# Patient Record
Sex: FEMALE | Race: WHITE | NOT HISPANIC OR LATINO | URBAN - METROPOLITAN AREA
[De-identification: names, ages, dates, MRNs, and addresses within clinical notes are randomized per-mention and may not be internally consistent; named-entity substitution may affect disease eponyms.]

---

## 2020-07-07 ENCOUNTER — TELEPHONE ENCOUNTER (OUTPATIENT)
Dept: URBAN - METROPOLITAN AREA CLINIC 92 | Facility: CLINIC | Age: 75
End: 2020-07-07

## 2020-08-13 ENCOUNTER — OFFICE VISIT (OUTPATIENT)
Dept: URBAN - METROPOLITAN AREA CLINIC 12 | Facility: CLINIC | Age: 75
End: 2020-08-13

## 2020-10-07 ENCOUNTER — OFFICE VISIT (OUTPATIENT)
Dept: URBAN - METROPOLITAN AREA CLINIC 78 | Facility: CLINIC | Age: 75
End: 2020-10-07

## 2020-11-11 ENCOUNTER — OFFICE VISIT (OUTPATIENT)
Dept: URBAN - METROPOLITAN AREA CLINIC 12 | Facility: CLINIC | Age: 75
End: 2020-11-11

## 2020-11-18 ENCOUNTER — OFFICE VISIT (OUTPATIENT)
Dept: URBAN - METROPOLITAN AREA CLINIC 12 | Facility: CLINIC | Age: 75
End: 2020-11-18
Payer: MEDICARE

## 2020-11-18 VITALS
HEIGHT: 64 IN | HEART RATE: 87 BPM | DIASTOLIC BLOOD PRESSURE: 83 MMHG | TEMPERATURE: 97.3 F | BODY MASS INDEX: 28.79 KG/M2 | SYSTOLIC BLOOD PRESSURE: 135 MMHG | WEIGHT: 168.6 LBS

## 2020-11-18 DIAGNOSIS — K30 FUNCTIONAL DYSPEPSIA: ICD-10-CM

## 2020-11-18 DIAGNOSIS — K58.9 IBS (IRRITABLE BOWEL SYNDROME): ICD-10-CM

## 2020-11-18 PROCEDURE — 99213 OFFICE O/P EST LOW 20 MIN: CPT | Performed by: STUDENT IN AN ORGANIZED HEALTH CARE EDUCATION/TRAINING PROGRAM

## 2020-11-18 PROCEDURE — G8482 FLU IMMUNIZE ORDER/ADMIN: HCPCS | Performed by: STUDENT IN AN ORGANIZED HEALTH CARE EDUCATION/TRAINING PROGRAM

## 2020-11-18 PROCEDURE — G8420 CALC BMI NORM PARAMETERS: HCPCS | Performed by: STUDENT IN AN ORGANIZED HEALTH CARE EDUCATION/TRAINING PROGRAM

## 2020-11-18 PROCEDURE — G8427 DOCREV CUR MEDS BY ELIG CLIN: HCPCS | Performed by: STUDENT IN AN ORGANIZED HEALTH CARE EDUCATION/TRAINING PROGRAM

## 2020-11-18 PROCEDURE — G9903 PT SCRN TBCO ID AS NON USER: HCPCS | Performed by: STUDENT IN AN ORGANIZED HEALTH CARE EDUCATION/TRAINING PROGRAM

## 2020-11-18 RX ORDER — ESCITALOPRAM 20 MG/1
1 TABLET TABLET, FILM COATED ORAL ONCE A DAY
Status: ACTIVE | COMMUNITY

## 2020-11-18 RX ORDER — OMEPRAZOLE 40 MG/1
TAKE 1 CAPSULE BY ORAL ROUTE QD CAPSULE, DELAYED RELEASE PELLETS ORAL 1
Qty: 30 | Refills: 3 | Status: ON HOLD | COMMUNITY
Start: 2017-05-24

## 2020-11-18 RX ORDER — BUSPIRONE HYDROCHLORIDE 7.5 MG/1
1 TABLET TABLET ORAL TWICE A DAY
Status: ACTIVE | COMMUNITY

## 2020-11-18 NOTE — HPI-TODAY'S VISIT:
75F here for follow up of gas/bloating. Last seen via telehealth by Dr. Burton. Symptoms are worsened with extremes of her mood -- particularly when stressed, anxious, angry etc. Has tried low fodmap without much relief, but says it is difficult to adhere to that. Tried a probiotic without relief.  Has bouts of diarrhea and constipation alternating every few days  Has tried hyoscyamine, which she says sometimes helps but sometimes does not  Symptoms worsened when she drinks lacohol CT scan in 5/2019 was normal.

## 2020-11-19 PROBLEM — 10743008 IRRITABLE BOWEL SYNDROME: Status: ACTIVE | Noted: 2020-11-19

## 2020-11-19 PROBLEM — 3696007 FUNCTIONAL DYSPEPSIA: Status: ACTIVE | Noted: 2020-11-19

## 2021-03-16 ENCOUNTER — DASHBOARD ENCOUNTERS (OUTPATIENT)
Age: 76
End: 2021-03-16

## 2021-03-16 ENCOUNTER — OFFICE VISIT (OUTPATIENT)
Dept: URBAN - METROPOLITAN AREA CLINIC 12 | Facility: CLINIC | Age: 76
End: 2021-03-16
Payer: MEDICARE

## 2021-03-16 VITALS
SYSTOLIC BLOOD PRESSURE: 143 MMHG | HEIGHT: 64 IN | HEART RATE: 86 BPM | DIASTOLIC BLOOD PRESSURE: 86 MMHG | WEIGHT: 168.4 LBS | BODY MASS INDEX: 28.75 KG/M2 | TEMPERATURE: 97.1 F

## 2021-03-16 DIAGNOSIS — R11.0 NAUSEA: ICD-10-CM

## 2021-03-16 DIAGNOSIS — K58.9 IBS (IRRITABLE BOWEL SYNDROME) DIARRHEA PREDOMINANT: ICD-10-CM

## 2021-03-16 DIAGNOSIS — R10.84 GENERALIZED ABDOMINAL PAIN: ICD-10-CM

## 2021-03-16 DIAGNOSIS — K52.9 CHRONIC DIARRHEA: ICD-10-CM

## 2021-03-16 PROBLEM — 10743008 IRRITABLE BOWEL SYNDROME: Status: ACTIVE | Noted: 2021-03-16

## 2021-03-16 PROCEDURE — 99213 OFFICE O/P EST LOW 20 MIN: CPT | Performed by: INTERNAL MEDICINE

## 2021-03-16 RX ORDER — BUSPIRONE HYDROCHLORIDE 7.5 MG/1
1 TABLET TABLET ORAL TWICE A DAY
Status: ACTIVE | COMMUNITY

## 2021-03-16 RX ORDER — RIFAXIMIN 550 MG/1
1 TABLET TABLET ORAL TWICE A DAY
Qty: 28 TABLET | Refills: 0 | OUTPATIENT
Start: 2021-03-16 | End: 2021-03-30

## 2021-03-16 RX ORDER — OMEPRAZOLE 40 MG/1
TAKE 1 CAPSULE BY ORAL ROUTE QD CAPSULE, DELAYED RELEASE PELLETS ORAL 1
Qty: 30 | Refills: 3 | Status: ON HOLD | COMMUNITY
Start: 2017-05-24

## 2021-03-16 RX ORDER — ESCITALOPRAM 20 MG/1
1 TABLET TABLET, FILM COATED ORAL ONCE A DAY
Status: ACTIVE | COMMUNITY

## 2021-03-16 NOTE — HPI-TODAY'S VISIT:
75F [id number]--laxoxzwmmtikxi she has seen dr swanson and dr rodríguez for gas, bloating, abd pain she has been given PPI, anti-spasmodics, FODMAP, IBgard today she c/o gas, diarrhea, crampy abd pain no recent abx use diarrhea everyday x 3 weeks. no blood no fever

## 2021-03-22 ENCOUNTER — TELEPHONE ENCOUNTER (OUTPATIENT)
Dept: URBAN - METROPOLITAN AREA CLINIC 78 | Facility: CLINIC | Age: 76
End: 2021-03-22

## 2021-03-31 LAB — GASTROINTESTINAL PATHOGEN: (no result)

## 2021-04-02 ENCOUNTER — OFFICE VISIT (OUTPATIENT)
Dept: URBAN - METROPOLITAN AREA MEDICAL CENTER 28 | Facility: MEDICAL CENTER | Age: 76
End: 2021-04-02
Payer: MEDICARE

## 2021-04-02 DIAGNOSIS — K86.89 ATROPHIC PANCREAS: ICD-10-CM

## 2021-04-02 DIAGNOSIS — K29.80 ACUTE DUODENITIS: ICD-10-CM

## 2021-04-02 DIAGNOSIS — C25.1 CANCER, PANCREAS, BODY: ICD-10-CM

## 2021-04-02 DIAGNOSIS — K22.2 ACQUIRED ESOPHAGEAL RING: ICD-10-CM

## 2021-04-02 DIAGNOSIS — R93.3 ABN FINDINGS-GI TRACT: ICD-10-CM

## 2021-04-02 PROCEDURE — 43239 EGD BIOPSY SINGLE/MULTIPLE: CPT | Performed by: INTERNAL MEDICINE

## 2021-04-02 PROCEDURE — 43242 EGD US FINE NEEDLE BX/ASPIR: CPT | Performed by: INTERNAL MEDICINE

## 2021-04-02 RX ORDER — OMEPRAZOLE 40 MG/1
TAKE 1 CAPSULE BY ORAL ROUTE QD CAPSULE, DELAYED RELEASE PELLETS ORAL 1
Qty: 30 | Refills: 3 | Status: ON HOLD | COMMUNITY
Start: 2017-05-24

## 2021-04-02 RX ORDER — ESCITALOPRAM 20 MG/1
1 TABLET TABLET, FILM COATED ORAL ONCE A DAY
Status: ACTIVE | COMMUNITY

## 2021-04-02 RX ORDER — BUSPIRONE HYDROCHLORIDE 7.5 MG/1
1 TABLET TABLET ORAL TWICE A DAY
Status: ACTIVE | COMMUNITY

## 2021-04-07 ENCOUNTER — TELEPHONE ENCOUNTER (OUTPATIENT)
Dept: URBAN - METROPOLITAN AREA CLINIC 23 | Facility: CLINIC | Age: 76
End: 2021-04-07